# Patient Record
(demographics unavailable — no encounter records)

---

## 2025-03-27 NOTE — HISTORY OF PRESENT ILLNESS
[Family Member] : family member [FreeTextEntry1] : Vascular Dementia  [FreeTextEntry2] : PMH:85 year-old Weakness Seizure Dysphagia, hypertension, Atrial fibrillation, unspecified type Confusion, Acute deep vein thrombosis (DVT) of popliteal vein of left lower extremity Multiple subsegmental pulmonary, Cardiomegaly [I51.7] 7/23/2024 - Thyroid goiter [E04.9] 7/23/2024 -Bilateral pleural effusion [J90]  Hx of spontaneous intraparenchymal Recently informed that the DVT in left leg returned.      Interval Events:     Medical Issues:     Specialists: Needs referral for cardiologist and Neurologist      Social hx:  Caregivers:  Subjective:  -Appetite/weight: appetite is good./ puree food only Weight is stable.  -Gait/falls: Wheelchair bound. No falls.  -Pain: Shoulder, left leg at times. Dtr administers Tylenol as needed  -Sleep: sleeps wells.  -BMs: regular no straining.  -Urine: no issue.  -Skin: rash on the groin area  -DME:  -Mood/memory: mood is good. Short memory is failed.  -Communication: conversational, pleasant. minimal medical literacy  -Health Maintenance: vaccinated up to date. Colonoscopy one year ago.  -Hospitalizations in the past year: none.   MOLST:

## 2025-03-27 NOTE — REASON FOR VISIT
[Initial Evaluation] : an initial evaluation [Pre-Visit Preparation] : pre-visit preparation was done [FreeTextEntry2] : Spoke with pt's dtr

## 2025-05-23 NOTE — HEALTH RISK ASSESSMENT
[Some assistance needed] : using telephone [Full assistance needed] : managing finances [No falls in past year] : Patient reported no falls in the past year [No] : The patient does not have visual impairment [de-identified] : Pt is bedbound

## 2025-05-23 NOTE — FAMILY HISTORY
[Family History Reviewed and Updated] : family history reviewed and updated [de-identified] : Pt has four children

## 2025-05-23 NOTE — HISTORY OF PRESENT ILLNESS
[In-Place] : has aide services in-place [Family Member] : family member [FreeTextEntry1] : Frailty [FreeTextEntry2] : This is an 85-year-old female with a history of gait instability, weakness, and an inability to perform activities of daily living. She has a history of intraparenchymal hemorrhage of the brain and seizures along with dysphagia. Her medical history includes essential benign hypertension, atrial fibrillation and an abnormal EKG. 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 In the past, she may have been diagnosed with P.O.T.S. and a possible unspecified autoimmune disease.    Interval Events: The patient was found alert, pleasant, and in no apparent distress. She resides at home with her son and receives care from a home health aide (HHA). She was observed sitting upright in her wheelchair watching cartoons. The patient is bedbound but remains interactive. She reported having eaten and denied any pain. As per HHA there's no evidence of pressure injuries or skin breakdown. Patient's daughter prepares her medications, which are administered by the HHA.  Communicated with the dtr over the phone during the visit. Lab ordered     Medical Issues Protonix 40 mg: GERD Vitamin D 2,000 IU: vitamin D deficiency/bone health Depakote 125 mg: seizure disorder Eliquis 2.5 mg: DVT Vasotec 5 mg: hypertension and cardiomegaly/stopped on Sunday 5/18/2025 Seroquel 25 mg: Mood stabilization and possible agitation Depakote 250 mg: Seizure disorder   Specialists: Referral sent for cardiologist and Neurologist      Social hx:  Caregivers:  Subjective:  -Appetite/weight: appetite is good./ puree food only Weight is stable.  -Gait/falls: Wheelchair bound. No falls.  -Pain: Shoulder, left leg at times. Dtr administers Tylenol as needed  -Sleep: sleeps wells.  -BMs: regular no straining.  -Urine: no issue.  -Skin: Skin intatct  -DME:Wheelchair, hospital bed  -Mood/memory: mood is good. Short memory is failed.  -Communication: conversational, pleasant. minimal medical literacy  -Health Maintenance: No vaccination.   -Hospitalizations in the past year:  3/18/24-3/26/24 [facility] -At home had left side temporary paralysis, vision loss and 2 seizures -DX with a hemorrhagic brain bleed in her right occipital lobe with swelling. 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 -EEG, ct scans, blood work, were done.   -3/26/24-4/11/24 [facility] Acute Rehab ([facility] (Delirium: (even though it looked like dementia) hallucinations, delusions, paranoia, intense agitation, angry, anxious, combative, cursing, speaking in nasty way, etc.. Seems unable to have several, uninterrupted hours of sleep and sometimes is awake all night. Lethargy (of course can be due to the TBI, lack of sleep, above symptoms, lack of proper nutrition). All the above has caused PT, OT to be significantly limited. Speech as well but less so.)   -[facility]  [facility] with very little physical improvement (due to facility limits and not her capabilities)   -7/14/24- 7/21/24 [facility] [place] (basically stayed in wheel chair except for few therapy sessions)  -7/21/24-7/26/24 [facility] Rehab ([facility]- Dx: embolism in both lungs and DVT in left leg.  -10/28/24- [facility] 5 day stay:   MOLST: DTR/ HCP to review with siblings

## 2025-05-23 NOTE — REVIEW OF SYSTEMS
[Muscle Weakness] : muscle weakness [Memory Loss] : memory loss [Negative] : Heme/Lymph [FreeTextEntry9] : Bedbound

## 2025-05-23 NOTE — FAMILY HISTORY
[Family History Reviewed and Updated] : family history reviewed and updated [de-identified] : Pt has four children

## 2025-05-23 NOTE — COUNSELING
[___] : [unfilled] [] : foot exam [Decrease hospital use] : decrease hospital use [Discussed disease trajectory with patient/caregiver] : discussed disease trajectory with patient/caregiver

## 2025-05-23 NOTE — REASON FOR VISIT
[Follow-Up] : a follow-up visit [Formal Caregiver] : formal caregiver [Pre-Visit Preparation] : pre-visit preparation was done [FreeTextEntry2] : Spoke with pt's dtr

## 2025-05-23 NOTE — REASON FOR VISIT
Pt presents with left shoulder and neck pain x2 weeks. Denies injuries. Denies treatment.   [Follow-Up] : a follow-up visit [Formal Caregiver] : formal caregiver [Pre-Visit Preparation] : pre-visit preparation was done [FreeTextEntry2] : Spoke with pt's dtr

## 2025-05-23 NOTE — PHYSICAL EXAM
[No Acute Distress] : no acute distress [Normal Sclera/Conjunctiva] : normal sclera/conjunctiva [EOMI] : extra ocular movement intact [Normal Outer Ear/Nose] : the ears and nose were normal in appearance [Normal Oropharynx] : the oropharynx was normal [No Respiratory Distress] : no respiratory distress [Clear to Auscultation] : lungs were clear to auscultation bilaterally [No Accessory Muscle Use] : no accessory muscle use [Normal Rate] : heart rate was normal  [Regular Rhythm] : with a regular rhythm [Normal S1, S2] : normal S1 and S2 [No Murmurs] : no murmurs heard [No Edema] : there was no peripheral edema [Normal Bowel Sounds] : normal bowel sounds [Non Tender] : non-tender [Soft] : abdomen soft [Not Distended] : not distended [Normal Post Cervical Nodes] : no posterior cervical lymphadenopathy [Normal Anterior Cervical Nodes] : no anterior cervical lymphadenopathy [No CVA Tenderness] : no ~M costovertebral angle tenderness [Normal Strength/Tone] : muscle strength and tone were normal [Normal Affect] : the affect was normal [de-identified] : Bedbound [de-identified] : rash in the groin area [de-identified] : Alert to people and place

## 2025-05-23 NOTE — HEALTH RISK ASSESSMENT
[Some assistance needed] : using telephone [Full assistance needed] : managing finances [No falls in past year] : Patient reported no falls in the past year [No] : The patient does not have visual impairment [de-identified] : Pt is bedbound

## 2025-05-23 NOTE — HISTORY OF PRESENT ILLNESS
[In-Place] : has aide services in-place [Family Member] : family member [FreeTextEntry1] : Frailty [FreeTextEntry2] : This is an 85-year-old female with a history of gait instability, weakness, and an inability to perform activities of daily living. She has a history of intraparenchymal hemorrhage of the brain and seizures along with dysphagia. Her medical history includes essential benign hypertension, atrial fibrillation and an abnormal EKG. 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 In the past, she may have been diagnosed with P.O.T.S. and a possible unspecified autoimmune disease.    Interval Events: The patient was found alert, pleasant, and in no apparent distress. She resides at home with her son and receives care from a home health aide (HHA). She was observed sitting upright in her wheelchair watching cartoons. The patient is bedbound but remains interactive. She reported having eaten and denied any pain. As per HHA there's no evidence of pressure injuries or skin breakdown. Patient's daughter prepares her medications, which are administered by the HHA.  Communicated with the dtr over the phone during the visit. Lab ordered     Medical Issues Protonix 40 mg: GERD Vitamin D 2,000 IU: vitamin D deficiency/bone health Depakote 125 mg: seizure disorder Eliquis 2.5 mg: DVT Vasotec 5 mg: hypertension and cardiomegaly/stopped on Sunday 5/18/2025 Seroquel 25 mg: Mood stabilization and possible agitation Depakote 250 mg: Seizure disorder   Specialists: Referral sent for cardiologist and Neurologist      Social hx:  Caregivers:  Subjective:  -Appetite/weight: appetite is good./ puree food only Weight is stable.  -Gait/falls: Wheelchair bound. No falls.  -Pain: Shoulder, left leg at times. Dtr administers Tylenol as needed  -Sleep: sleeps wells.  -BMs: regular no straining.  -Urine: no issue.  -Skin: Skin intatct  -DME:Wheelchair, hospital bed  -Mood/memory: mood is good. Short memory is failed.  -Communication: conversational, pleasant. minimal medical literacy  -Health Maintenance: No vaccination.   -Hospitalizations in the past year:  3/18/24-3/26/24 [facility] -At home had left side temporary paralysis, vision loss and 2 seizures -DX with a hemorrhagic brain bleed in her right occipital lobe with swelling. 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 -EEG, ct scans, blood work, were done.   -3/26/24-4/11/24 [facility] ([facility] (Delirium: (even though it looked like dementia) hallucinations, delusions, paranoia, intense agitation, angry, anxious, combative, cursing, speaking in nasty way, etc.. Seems unable to have several, uninterrupted hours of sleep and sometimes is awake all night. Lethargy (of course can be due to the TBI, lack of sleep, above symptoms, lack of proper nutrition). All the above has caused PT, OT to be significantly limited. Speech as well but less so.)   -[facility]11[facility] with very little physical improvement (due to facility limits and not her capabilities)   -7/14/24- 7/21/24 [facility] [place] (basically stayed in wheel chair except for few therapy sessions)  -7/21/24-7/26/24 [facility]- Dx: embolism in both lungs and DVT in left leg.  -10/28/24- [facility] 5 day stay:   MOLST: DTR/ HCP to review with siblings

## 2025-05-23 NOTE — PHYSICAL EXAM
[No Acute Distress] : no acute distress [Normal Sclera/Conjunctiva] : normal sclera/conjunctiva [EOMI] : extra ocular movement intact [Normal Outer Ear/Nose] : the ears and nose were normal in appearance [Normal Oropharynx] : the oropharynx was normal [No Respiratory Distress] : no respiratory distress [Clear to Auscultation] : lungs were clear to auscultation bilaterally [No Accessory Muscle Use] : no accessory muscle use [Normal Rate] : heart rate was normal  [Regular Rhythm] : with a regular rhythm [Normal S1, S2] : normal S1 and S2 [No Murmurs] : no murmurs heard [No Edema] : there was no peripheral edema [Normal Bowel Sounds] : normal bowel sounds [Non Tender] : non-tender [Soft] : abdomen soft [Not Distended] : not distended [Normal Post Cervical Nodes] : no posterior cervical lymphadenopathy [Normal Anterior Cervical Nodes] : no anterior cervical lymphadenopathy [No CVA Tenderness] : no ~M costovertebral angle tenderness [Normal Strength/Tone] : muscle strength and tone were normal [Normal Affect] : the affect was normal [de-identified] : Bedbound [de-identified] : rash in the groin area [de-identified] : Alert to people and place

## 2025-06-28 NOTE — PLAN
[FreeTextEntry1] : C/W seroquel as prescribed encouraged fluids HC team to order CBC, CMP, UA, UCx HC team to call patient in the AM ED precautions discussed with HHA and daughter

## 2025-06-28 NOTE — ASSESSMENT
[Assessment Only] : [facility] Outcome: Assessment Only [Provided reassurance that there was no need to escalate care/change care plan] : Provided reassurance that there was no need to escalate care/change care plan [Yes] : If the [facility] evaluation had not been available would you have advised the patient to go to the ER?  Yes [FreeTextEntry2] : 110/70 75 96% 15 afebrile  Nontoxic, AAOx1-2, sedated, NAD NCAT supple neck MMM Lungs CTAB Abd soft NTND CHAVEZ extremities No rash [FreeTextEntry1] : Agitation likely i/s/o nonadherence to prescribed Seroquel.  No s/s of occult trauma or infection.   Discussed at length with pt's daugther that we cannot assess patient for recurrence of hemorrhagic CVA, which could cause her abnormal behavior/AMS/agitation.  R/B/A discussed; at this time daughter does not want patient to be transported to the hospital due to prior e/o deconditioning and confusion in ED.